# Patient Record
Sex: FEMALE | Race: WHITE | NOT HISPANIC OR LATINO | Employment: OTHER | URBAN - METROPOLITAN AREA
[De-identification: names, ages, dates, MRNs, and addresses within clinical notes are randomized per-mention and may not be internally consistent; named-entity substitution may affect disease eponyms.]

---

## 2017-04-19 ENCOUNTER — HOSPITAL ENCOUNTER (OUTPATIENT)
Dept: LAB | Facility: HOSPITAL | Age: 67
Setting detail: SPECIMEN
Discharge: HOME | End: 2017-04-19
Attending: INTERNAL MEDICINE | Admitting: INTERNAL MEDICINE

## 2017-04-19 LAB
ALBUMIN SERPL-MCNC: 3.6 G/DL (ref 3.5–4.8)
ALBUMIN/GLOB SERPL: 1.6 {RATIO} (ref 1–1.7)
ALP SERPL-CCNC: 87 IU/L (ref 32–91)
ALT SERPL-CCNC: 18 IU/L (ref 14–54)
ANION GAP SERPL CALC-SCNC: 14.2 MMOL/L (ref 10–20)
AST SERPL-CCNC: 19 IU/L (ref 15–41)
BILIRUB SERPL-MCNC: 0.6 MG/DL (ref 0.3–1.2)
BUN SERPL-MCNC: 8 MG/DL (ref 8–20)
BUN/CREAT SERPL: 16 (ref 5.4–26.2)
CALCIUM SERPL-MCNC: 9.5 MG/DL (ref 8.9–10.3)
CHLORIDE SERPL-SCNC: 104 MMOL/L (ref 101–111)
CHOLEST SERPL-MCNC: 179 MG/DL
CHOLEST/HDLC SERPL: 4.2 {RATIO}
CONV CO2: 26 MMOL/L (ref 22–32)
CONV LDL CHOLESTEROL DIRECT: 120 MG/DL (ref 0–100)
CONV TOTAL PROTEIN: 5.8 G/DL (ref 6.1–7.9)
CREAT UR-MCNC: 0.5 MG/DL (ref 0.4–1)
GLOBULIN UR ELPH-MCNC: 2.2 G/DL (ref 2.5–3.8)
GLUCOSE SERPL-MCNC: 109 MG/DL (ref 65–99)
HDLC SERPL-MCNC: 42 MG/DL
LDLC/HDLC SERPL: 2.8 {RATIO}
LIPID INTERPRETATION: ABNORMAL
POTASSIUM SERPL-SCNC: 4.2 MMOL/L (ref 3.6–5.1)
SODIUM SERPL-SCNC: 140 MMOL/L (ref 136–144)
T4 FREE SERPL-MCNC: 1.01 NG/DL (ref 0.58–1.64)
TRIGL SERPL-MCNC: 93 MG/DL
TSH SERPL-ACNC: 8.72 UIU/ML (ref 0.34–5.6)
VLDLC SERPL CALC-MCNC: 16.2 MG/DL

## 2017-04-26 ENCOUNTER — CONVERSION ENCOUNTER (OUTPATIENT)
Dept: ENDOCRINOLOGY | Facility: CLINIC | Age: 67
End: 2017-04-26

## 2017-07-21 ENCOUNTER — HOSPITAL ENCOUNTER (OUTPATIENT)
Dept: LAB | Facility: HOSPITAL | Age: 67
Setting detail: SPECIMEN
Discharge: HOME | End: 2017-07-21
Attending: INTERNAL MEDICINE | Admitting: INTERNAL MEDICINE

## 2017-07-21 LAB
T4 FREE SERPL-MCNC: 1.07 NG/DL (ref 0.58–1.64)
TSH SERPL-ACNC: 2.8 UIU/ML (ref 0.34–5.6)

## 2018-04-18 ENCOUNTER — HOSPITAL ENCOUNTER (OUTPATIENT)
Dept: LAB | Facility: HOSPITAL | Age: 68
Setting detail: SPECIMEN
Discharge: HOME | End: 2018-04-18
Attending: INTERNAL MEDICINE | Admitting: INTERNAL MEDICINE

## 2018-04-18 LAB
ALBUMIN SERPL-MCNC: 3.7 G/DL (ref 3.5–4.8)
ALBUMIN/GLOB SERPL: 1.8 {RATIO} (ref 1–1.7)
ALP SERPL-CCNC: 87 IU/L (ref 32–91)
ALT SERPL-CCNC: 18 IU/L (ref 14–54)
ANION GAP SERPL CALC-SCNC: 10.2 MMOL/L (ref 10–20)
AST SERPL-CCNC: 18 IU/L (ref 15–41)
BILIRUB SERPL-MCNC: 0.4 MG/DL (ref 0.3–1.2)
BUN SERPL-MCNC: 13 MG/DL (ref 8–20)
BUN/CREAT SERPL: 14.4 (ref 5.4–26.2)
CALCIUM SERPL-MCNC: 9.1 MG/DL (ref 8.9–10.3)
CHLORIDE SERPL-SCNC: 104 MMOL/L (ref 101–111)
CHOLEST SERPL-MCNC: 150 MG/DL
CHOLEST/HDLC SERPL: 4 {RATIO}
CONV CO2: 26 MMOL/L (ref 22–32)
CONV LDL CHOLESTEROL DIRECT: 95 MG/DL (ref 0–100)
CONV TOTAL PROTEIN: 5.8 G/DL (ref 6.1–7.9)
CREAT UR-MCNC: 0.9 MG/DL (ref 0.4–1)
GLOBULIN UR ELPH-MCNC: 2.1 G/DL (ref 2.5–3.8)
GLUCOSE SERPL-MCNC: 118 MG/DL (ref 65–99)
HDLC SERPL-MCNC: 37 MG/DL
LDLC/HDLC SERPL: 2.5 {RATIO}
LIPID INTERPRETATION: ABNORMAL
POTASSIUM SERPL-SCNC: 4.2 MMOL/L (ref 3.6–5.1)
SODIUM SERPL-SCNC: 136 MMOL/L (ref 136–144)
TRIGL SERPL-MCNC: 76 MG/DL
VLDLC SERPL CALC-MCNC: 17.8 MG/DL

## 2018-04-25 ENCOUNTER — CONVERSION ENCOUNTER (OUTPATIENT)
Dept: ENDOCRINOLOGY | Facility: CLINIC | Age: 68
End: 2018-04-25

## 2019-04-17 ENCOUNTER — HOSPITAL ENCOUNTER (OUTPATIENT)
Dept: LAB | Facility: HOSPITAL | Age: 69
Setting detail: SPECIMEN
Discharge: HOME | End: 2019-04-17
Attending: INTERNAL MEDICINE | Admitting: INTERNAL MEDICINE

## 2019-04-17 LAB
ALBUMIN SERPL-MCNC: 3.7 G/DL (ref 3.5–4.8)
ALBUMIN/GLOB SERPL: 1.5 {RATIO} (ref 1–1.7)
ALP SERPL-CCNC: 93 IU/L (ref 32–91)
ALT SERPL-CCNC: 16 IU/L (ref 14–54)
ANION GAP SERPL CALC-SCNC: 14.9 MMOL/L (ref 10–20)
AST SERPL-CCNC: 18 IU/L (ref 15–41)
BILIRUB SERPL-MCNC: 0.4 MG/DL (ref 0.3–1.2)
BUN SERPL-MCNC: 11 MG/DL (ref 8–20)
BUN/CREAT SERPL: 13.8 (ref 5.4–26.2)
CALCIUM SERPL-MCNC: 9.3 MG/DL (ref 8.9–10.3)
CHLORIDE SERPL-SCNC: 102 MMOL/L (ref 101–111)
CHOLEST SERPL-MCNC: 178 MG/DL
CHOLEST/HDLC SERPL: 4.7 {RATIO}
CONV CO2: 23 MMOL/L (ref 22–32)
CONV LDL CHOLESTEROL DIRECT: 134 MG/DL (ref 0–100)
CONV TOTAL PROTEIN: 6.1 G/DL (ref 6.1–7.9)
CREAT UR-MCNC: 0.8 MG/DL (ref 0.4–1)
GLOBULIN UR ELPH-MCNC: 2.4 G/DL (ref 2.5–3.8)
GLUCOSE SERPL-MCNC: 103 MG/DL (ref 65–99)
HDLC SERPL-MCNC: 38 MG/DL
LDLC/HDLC SERPL: 3.6 {RATIO}
LIPID INTERPRETATION: ABNORMAL
POTASSIUM SERPL-SCNC: 3.9 MMOL/L (ref 3.6–5.1)
SODIUM SERPL-SCNC: 136 MMOL/L (ref 136–144)
TRIGL SERPL-MCNC: 98 MG/DL
VLDLC SERPL CALC-MCNC: 5.9 MG/DL

## 2019-04-24 ENCOUNTER — CONVERSION ENCOUNTER (OUTPATIENT)
Dept: ENDOCRINOLOGY | Facility: CLINIC | Age: 69
End: 2019-04-24

## 2019-06-04 VITALS
SYSTOLIC BLOOD PRESSURE: 145 MMHG | DIASTOLIC BLOOD PRESSURE: 97 MMHG | WEIGHT: 197 LBS | BODY MASS INDEX: 31.66 KG/M2 | HEIGHT: 66 IN | DIASTOLIC BLOOD PRESSURE: 88 MMHG | HEART RATE: 95 BPM | SYSTOLIC BLOOD PRESSURE: 132 MMHG | WEIGHT: 192 LBS | SYSTOLIC BLOOD PRESSURE: 145 MMHG | HEIGHT: 66 IN | OXYGEN SATURATION: 98 % | DIASTOLIC BLOOD PRESSURE: 100 MMHG | WEIGHT: 193 LBS | HEART RATE: 98 BPM | BODY MASS INDEX: 31.02 KG/M2 | HEART RATE: 80 BPM | OXYGEN SATURATION: 99 %

## 2019-07-22 RX ORDER — ATENOLOL 25 MG/1
TABLET ORAL
Qty: 60 TABLET | Refills: 3 | Status: SHIPPED | OUTPATIENT
Start: 2019-07-22 | End: 2019-11-16 | Stop reason: SDUPTHER

## 2019-11-18 RX ORDER — ATENOLOL 25 MG/1
TABLET ORAL
Qty: 60 TABLET | Refills: 2 | Status: SHIPPED | OUTPATIENT
Start: 2019-11-18 | End: 2020-02-17

## 2019-12-19 DIAGNOSIS — E04.9 GOITER: ICD-10-CM

## 2019-12-19 DIAGNOSIS — E03.9 HYPOTHYROIDISM, UNSPECIFIED TYPE: ICD-10-CM

## 2019-12-19 DIAGNOSIS — E89.0 HYPOTHYROIDISM FOLLOWING RADIOIODINE THERAPY: ICD-10-CM

## 2019-12-19 DIAGNOSIS — E05.00 GRAVES' DISEASE: ICD-10-CM

## 2019-12-19 DIAGNOSIS — I10 HYPERTENSION, UNSPECIFIED TYPE: Primary | ICD-10-CM

## 2020-01-24 RX ORDER — ASPIRIN 81 MG/1
TABLET ORAL EVERY 24 HOURS
COMMUNITY
Start: 2015-04-22 | End: 2022-04-04

## 2020-01-24 RX ORDER — LEVOTHYROXINE SODIUM 137 UG/1
TABLET ORAL EVERY 24 HOURS
COMMUNITY
Start: 2019-04-02 | End: 2020-01-24 | Stop reason: SDUPTHER

## 2020-01-24 RX ORDER — LEVOTHYROXINE SODIUM 137 UG/1
TABLET ORAL
Qty: 30 TABLET | Refills: 3 | Status: SHIPPED | OUTPATIENT
Start: 2020-01-24 | End: 2020-04-01

## 2020-02-17 ENCOUNTER — TELEPHONE (OUTPATIENT)
Dept: ENDOCRINOLOGY | Facility: CLINIC | Age: 70
End: 2020-02-17

## 2020-02-17 RX ORDER — ATENOLOL 25 MG/1
TABLET ORAL
Qty: 60 TABLET | Refills: 2 | Status: SHIPPED | OUTPATIENT
Start: 2020-02-17 | End: 2020-04-01 | Stop reason: SDUPTHER

## 2020-02-17 RX ORDER — SULFAMETHOXAZOLE AND TRIMETHOPRIM 800; 160 MG/1; MG/1
TABLET ORAL
Qty: 28 TABLET | Refills: 0 | Status: SHIPPED | OUTPATIENT
Start: 2020-02-17 | End: 2020-04-01

## 2020-02-17 NOTE — TELEPHONE ENCOUNTER
Patient called and states that she has had a Sinus infection/ cold/ cough and it just wont go away. She is asking If there is anything that you could send in for her? I told her to contact her PCP, she said that you had told her to call if she needed anything as long as it didn't happen to often. Please advise?

## 2020-02-18 NOTE — TELEPHONE ENCOUNTER
Notified patient, she said she has already picked it up and feels so much better already after last nights dose.

## 2020-03-30 ENCOUNTER — LAB (OUTPATIENT)
Dept: LAB | Facility: HOSPITAL | Age: 70
End: 2020-03-30

## 2020-03-30 ENCOUNTER — TELEPHONE (OUTPATIENT)
Dept: ENDOCRINOLOGY | Facility: CLINIC | Age: 70
End: 2020-03-30

## 2020-03-30 DIAGNOSIS — E04.9 GOITER: ICD-10-CM

## 2020-03-30 DIAGNOSIS — E03.9 HYPOTHYROIDISM, UNSPECIFIED TYPE: ICD-10-CM

## 2020-03-30 DIAGNOSIS — I10 HYPERTENSION, UNSPECIFIED TYPE: ICD-10-CM

## 2020-03-30 DIAGNOSIS — E05.00 GRAVES' DISEASE: ICD-10-CM

## 2020-03-30 DIAGNOSIS — E89.0 HYPOTHYROIDISM FOLLOWING RADIOIODINE THERAPY: ICD-10-CM

## 2020-03-30 LAB
ALBUMIN SERPL-MCNC: 4.4 G/DL (ref 3.5–5.2)
ALBUMIN/GLOB SERPL: 2.1 G/DL
ALP SERPL-CCNC: 103 U/L (ref 39–117)
ALT SERPL W P-5'-P-CCNC: 15 U/L (ref 1–33)
ANION GAP SERPL CALCULATED.3IONS-SCNC: 12.3 MMOL/L (ref 5–15)
AST SERPL-CCNC: 14 U/L (ref 1–32)
BILIRUB SERPL-MCNC: 0.3 MG/DL (ref 0.2–1.2)
BUN BLD-MCNC: 14 MG/DL (ref 8–23)
BUN/CREAT SERPL: 19.4 (ref 7–25)
CALCIUM SPEC-SCNC: 9.4 MG/DL (ref 8.6–10.5)
CHLORIDE SERPL-SCNC: 100 MMOL/L (ref 98–107)
CHOLEST SERPL-MCNC: 166 MG/DL (ref 0–200)
CO2 SERPL-SCNC: 23.7 MMOL/L (ref 22–29)
CREAT BLD-MCNC: 0.72 MG/DL (ref 0.57–1)
GFR SERPL CREATININE-BSD FRML MDRD: 81 ML/MIN/1.73
GLOBULIN UR ELPH-MCNC: 2.1 GM/DL
GLUCOSE BLD-MCNC: 108 MG/DL (ref 65–99)
HDLC SERPL-MCNC: 36 MG/DL (ref 40–60)
LDLC SERPL CALC-MCNC: 105 MG/DL (ref 0–100)
LDLC/HDLC SERPL: 2.92 {RATIO}
POTASSIUM BLD-SCNC: 4.3 MMOL/L (ref 3.5–5.2)
PROT SERPL-MCNC: 6.5 G/DL (ref 6–8.5)
SODIUM BLD-SCNC: 136 MMOL/L (ref 136–145)
T4 FREE SERPL-MCNC: 1.86 NG/DL (ref 0.93–1.7)
TRIGL SERPL-MCNC: 124 MG/DL (ref 0–150)
TSH SERPL DL<=0.05 MIU/L-ACNC: 1.38 UIU/ML (ref 0.27–4.2)
VLDLC SERPL-MCNC: 24.8 MG/DL (ref 5–40)

## 2020-03-30 PROCEDURE — 84443 ASSAY THYROID STIM HORMONE: CPT

## 2020-03-30 PROCEDURE — 80061 LIPID PANEL: CPT

## 2020-03-30 PROCEDURE — 80053 COMPREHEN METABOLIC PANEL: CPT

## 2020-03-30 PROCEDURE — 36415 COLL VENOUS BLD VENIPUNCTURE: CPT

## 2020-03-30 PROCEDURE — 84439 ASSAY OF FREE THYROXINE: CPT

## 2020-04-01 ENCOUNTER — TELEMEDICINE (OUTPATIENT)
Dept: ENDOCRINOLOGY | Facility: CLINIC | Age: 70
End: 2020-04-01

## 2020-04-01 VITALS — BODY MASS INDEX: 29.25 KG/M2 | TEMPERATURE: 98.1 F | WEIGHT: 182 LBS | HEART RATE: 80 BPM | HEIGHT: 66 IN

## 2020-04-01 DIAGNOSIS — E89.0 HYPOTHYROIDISM FOLLOWING RADIOIODINE THERAPY: Primary | ICD-10-CM

## 2020-04-01 DIAGNOSIS — R73.9 HYPERGLYCEMIA: ICD-10-CM

## 2020-04-01 DIAGNOSIS — I10 ESSENTIAL HYPERTENSION: ICD-10-CM

## 2020-04-01 PROCEDURE — 99212 OFFICE O/P EST SF 10 MIN: CPT | Performed by: INTERNAL MEDICINE

## 2020-04-01 RX ORDER — LEVOTHYROXINE SODIUM 0.12 MG/1
125 TABLET ORAL DAILY
Qty: 90 TABLET | Refills: 3 | Status: SHIPPED | OUTPATIENT
Start: 2020-04-01 | End: 2021-03-29

## 2020-04-01 RX ORDER — CHLORAL HYDRATE 500 MG
CAPSULE ORAL
COMMUNITY
End: 2020-04-01

## 2020-04-01 RX ORDER — AMOXICILLIN AND CLAVULANATE POTASSIUM 875; 125 MG/1; MG/1
TABLET, FILM COATED ORAL
COMMUNITY
Start: 2020-02-18 | End: 2020-04-01

## 2020-04-01 RX ORDER — HYDROCODONE BITARTRATE AND ACETAMINOPHEN 5; 325 MG/1; MG/1
TABLET ORAL
COMMUNITY
Start: 2020-02-18 | End: 2020-04-01

## 2020-04-01 RX ORDER — ATENOLOL 25 MG/1
TABLET ORAL
Qty: 180 TABLET | Refills: 3 | Status: SHIPPED | OUTPATIENT
Start: 2020-04-01 | End: 2021-04-05 | Stop reason: SDUPTHER

## 2020-04-01 NOTE — PROGRESS NOTES
[facility]        Patient Care Team:  Urbano Noel MD as PCP - General  Provider, No Known as PCP - Family Medicine  Devin Gary MD as PCP - Claims Attributed    Chief Complaint:    Chief Complaint   Patient presents with   • Hypothyroidism         Subjective     Here for thyroid f/u  This is a telemedicine visit in view of the covid 19 pandemic using phone only due to problems with connection.  Taking meds regularly  Exercise program: daily x 30 min    Interval History:     Patient Complaints: stressed  Patient Denies:  Palpitations, tremors or eye pain   History taken from: patient    Review of Systems:   Review of Systems   HENT: Negative for trouble swallowing.    Eyes: Negative for blurred vision.   Cardiovascular: Negative for palpitations.   Gastrointestinal: Negative for nausea.   Endocrine: Negative for polyuria.   Neurological: Negative for tremors and headache.     Lost  11 lb since last visit  Objective     Vital Signs  Temp:  [98.1 °F (36.7 °C)] 98.1 °F (36.7 °C)  Heart Rate:  [80] 80    Physical Exam:  Not done. eVisit                                                  Results Review:    I have reviewed the patient's new clinical results, labs & imaging.    Medication Review:   Prior to Admission medications    Medication Sig Start Date End Date Taking? Authorizing Provider   Ascorbic Acid (SM VITAMIN C) 500 MG chewable tablet Chew 1,000 mg Daily. 4/20/16  Yes ProviderTrudi MD   aspirin (ADULT ASPIRIN EC LOW STRENGTH) 81 MG EC tablet Daily. 4/22/15  Yes Trudi Whitlock MD   atenolol (TENORMIN) 25 MG tablet One tab in am & one @ supper 4/1/20  Yes Urbano Noel MD   CINNAMON PO CINNAMON CAPS 4/14/15  Yes ProviderTrudi MD   Magnesium Oxide -Mg Supplement 400 MG capsule MAGNESIUM 400 MG ORAL CAPS 4/22/15  Yes Trudi Whitlock MD   Misc Natural Products (LUTEIN 20 PO) Take 20 mg by mouth Daily.   Yes Trudi Whitlock MD   Selenium  (SELENIMIN PO) Take  by mouth.   Yes Trudi Whitlock MD   Zinc 50 MG capsule Take  by mouth.   Yes Trudi Whitlock MD   atenolol (TENORMIN) 25 MG tablet TAKE ONE TABLET BY MOUTH EVERY MORNING AND TAKE ONE TABLET BY MOUTH DAILY AT SUPPER 2/17/20 4/1/20 Yes Urbano Noel MD   levothyroxine (SYNTHROID) 137 MCG tablet Take one daily 1/24/20 4/1/20 Yes Urbano Noel MD   levothyroxine (SYNTHROID, LEVOTHROID) 125 MCG tablet Take 1 tablet by mouth Daily. 4/1/20   Urbano Noel MD   amoxicillin-clavulanate (AUGMENTIN) 875-125 MG per tablet  2/18/20 4/1/20  Trudi Whitlock MD   HYDROcodone-acetaminophen (NORCO) 5-325 MG per tablet  2/18/20 4/1/20  Trudi Whitlock MD   Omega-3 Fatty Acids (FISH OIL) 1000 MG capsule capsule Take  by mouth.  4/1/20  Trudi Whitlock MD   sulfamethoxazole-trimethoprim (BACTRIM DS,SEPTRA DS) 800-160 MG per tablet One po bid x 2 weeks 2/17/20 4/1/20  Urbano Noel MD       Lab Results (most recent)     None            No results found for: HGBA1C   Lab Results   Component Value Date    GLUCOSE 108 (H) 03/30/2020    BUN 14 03/30/2020    CREATININE 0.72 03/30/2020    EGFRIFNONA 81 03/30/2020    BCR 19.4 03/30/2020    K 4.3 03/30/2020    CO2 23.7 03/30/2020    CALCIUM 9.4 03/30/2020    ALBUMIN 4.40 03/30/2020    LABIL2 1.5 04/17/2019    AST 14 03/30/2020    ALT 15 03/30/2020    CHOL 166 03/30/2020     (H) 03/30/2020    HDL 36 (L) 03/30/2020    TRIG 124 03/30/2020     Lab Results   Component Value Date    TSH 1.380 03/30/2020    FREET4 1.86 (H) 03/30/2020       Assessment/Plan     Annamarie was seen today for hypothyroidism.    Diagnoses and all orders for this visit:    Hypothyroidism following radioiodine therapy  -     TSH; Future  -     T4, Free; Future    Essential hypertension    Hyperglycemia    Other orders  -     atenolol (TENORMIN) 25 MG tablet; One tab in am & one @ supper  -     levothyroxine (SYNTHROID, LEVOTHROID) 125 MCG tablet; Take 1  tablet by mouth Daily.        Continue exercise.  Continue diet. Decrease sugar intake.  Decrease levothyroxine to one 6 d / week until finished.  Then stop & start 125 mcg one daily.  Continue other meds.  Repeat labs in 3 months.  Will call you with the lab results.          Urbano Noel MD  04/01/20  11:09

## 2020-04-01 NOTE — PATIENT INSTRUCTIONS
Continue exercise.  Decrease levothyroxine to one 6 d / week until finished.  Then stop & start 125 mcg one daily.  Continue other meds.  Repeat labs in 3 months.  Will call you with the lab results.  F/u in 1 y, with fasting labs prior.

## 2020-06-29 ENCOUNTER — LAB (OUTPATIENT)
Dept: LAB | Facility: HOSPITAL | Age: 70
End: 2020-06-29

## 2020-06-29 DIAGNOSIS — E89.0 HYPOTHYROIDISM FOLLOWING RADIOIODINE THERAPY: ICD-10-CM

## 2020-06-29 LAB
T4 FREE SERPL-MCNC: 1.62 NG/DL (ref 0.93–1.7)
TSH SERPL DL<=0.05 MIU/L-ACNC: 2.9 UIU/ML (ref 0.27–4.2)

## 2020-06-29 PROCEDURE — 36415 COLL VENOUS BLD VENIPUNCTURE: CPT

## 2020-06-29 PROCEDURE — 84439 ASSAY OF FREE THYROXINE: CPT

## 2020-06-29 PROCEDURE — 84443 ASSAY THYROID STIM HORMONE: CPT

## 2021-03-26 DIAGNOSIS — E89.0 HYPOTHYROIDISM FOLLOWING RADIOIODINE THERAPY: Primary | ICD-10-CM

## 2021-03-29 ENCOUNTER — LAB (OUTPATIENT)
Dept: LAB | Facility: HOSPITAL | Age: 71
End: 2021-03-29

## 2021-03-29 DIAGNOSIS — E89.0 HYPOTHYROIDISM FOLLOWING RADIOIODINE THERAPY: ICD-10-CM

## 2021-03-29 LAB
ALBUMIN SERPL-MCNC: 3.9 G/DL (ref 3.5–5.2)
ALBUMIN/GLOB SERPL: 2 G/DL
ALP SERPL-CCNC: 96 U/L (ref 39–117)
ALT SERPL W P-5'-P-CCNC: 12 U/L (ref 1–33)
ANION GAP SERPL CALCULATED.3IONS-SCNC: 10.4 MMOL/L (ref 5–15)
AST SERPL-CCNC: 14 U/L (ref 1–32)
BILIRUB SERPL-MCNC: 0.3 MG/DL (ref 0–1.2)
BUN SERPL-MCNC: 16 MG/DL (ref 8–23)
BUN/CREAT SERPL: 21.1 (ref 7–25)
CALCIUM SPEC-SCNC: 9.1 MG/DL (ref 8.6–10.5)
CHLORIDE SERPL-SCNC: 104 MMOL/L (ref 98–107)
CHOLEST SERPL-MCNC: 139 MG/DL (ref 0–200)
CO2 SERPL-SCNC: 23.6 MMOL/L (ref 22–29)
CREAT SERPL-MCNC: 0.76 MG/DL (ref 0.57–1)
GFR SERPL CREATININE-BSD FRML MDRD: 76 ML/MIN/1.73
GLOBULIN UR ELPH-MCNC: 2 GM/DL
GLUCOSE SERPL-MCNC: 101 MG/DL (ref 65–99)
HDLC SERPL-MCNC: 37 MG/DL (ref 40–60)
LDLC SERPL CALC-MCNC: 83 MG/DL (ref 0–100)
LDLC/HDLC SERPL: 2.2 {RATIO}
POTASSIUM SERPL-SCNC: 4.3 MMOL/L (ref 3.5–5.2)
PROT SERPL-MCNC: 5.9 G/DL (ref 6–8.5)
SODIUM SERPL-SCNC: 138 MMOL/L (ref 136–145)
T4 FREE SERPL-MCNC: 1.69 NG/DL (ref 0.93–1.7)
TRIGL SERPL-MCNC: 103 MG/DL (ref 0–150)
TSH SERPL DL<=0.05 MIU/L-ACNC: 2.29 UIU/ML (ref 0.27–4.2)
VLDLC SERPL-MCNC: 19 MG/DL (ref 5–40)

## 2021-03-29 PROCEDURE — 36415 COLL VENOUS BLD VENIPUNCTURE: CPT

## 2021-03-29 PROCEDURE — 80053 COMPREHEN METABOLIC PANEL: CPT

## 2021-03-29 PROCEDURE — 80061 LIPID PANEL: CPT

## 2021-03-29 PROCEDURE — 84439 ASSAY OF FREE THYROXINE: CPT

## 2021-03-29 PROCEDURE — 84443 ASSAY THYROID STIM HORMONE: CPT

## 2021-03-29 RX ORDER — LEVOTHYROXINE SODIUM 0.12 MG/1
TABLET ORAL
Qty: 30 TABLET | Refills: 11 | Status: SHIPPED | OUTPATIENT
Start: 2021-03-29 | End: 2021-04-05

## 2021-04-05 ENCOUNTER — OFFICE VISIT (OUTPATIENT)
Dept: ENDOCRINOLOGY | Facility: CLINIC | Age: 71
End: 2021-04-05

## 2021-04-05 VITALS
WEIGHT: 183 LBS | OXYGEN SATURATION: 97 % | SYSTOLIC BLOOD PRESSURE: 140 MMHG | TEMPERATURE: 98 F | BODY MASS INDEX: 29.41 KG/M2 | HEIGHT: 66 IN | HEART RATE: 102 BPM | DIASTOLIC BLOOD PRESSURE: 85 MMHG

## 2021-04-05 DIAGNOSIS — I10 ESSENTIAL HYPERTENSION: ICD-10-CM

## 2021-04-05 DIAGNOSIS — E89.0 HYPOTHYROIDISM FOLLOWING RADIOIODINE THERAPY: Primary | ICD-10-CM

## 2021-04-05 PROCEDURE — 99213 OFFICE O/P EST LOW 20 MIN: CPT | Performed by: INTERNAL MEDICINE

## 2021-04-05 RX ORDER — LEVOTHYROXINE SODIUM 0.12 MG/1
TABLET ORAL
Start: 2021-04-05 | End: 2022-04-04

## 2021-04-05 RX ORDER — ATENOLOL 25 MG/1
TABLET ORAL
Qty: 180 TABLET | Refills: 3 | Status: SHIPPED | OUTPATIENT
Start: 2021-04-05 | End: 2022-04-04 | Stop reason: SDUPTHER

## 2021-04-05 RX ORDER — MAGNESIUM OXIDE 400 MG/1
TABLET ORAL
COMMUNITY
End: 2021-04-05

## 2021-04-05 NOTE — PATIENT INSTRUCTIONS
Continue exercise.  Decrease levothyroxine to one 6 d / week.  Continue vit D supplements.  F/u in 1 , with fasting labs prior.

## 2021-04-05 NOTE — PROGRESS NOTES
[facility]        Patient Care Team:  Provider, Angelita Known as PCP - General    Chief Complaint:    Chief Complaint   Patient presents with   • Hypothyroidism         Subjective     Here for thyroid f/u  Working on diet  Exercise program: walking & horse back riding  Taking calcium +D    Interval History:     Patient Complaints: trouble sleeping  Patient Denies:  Palpitations   History taken from: patient    Review of Systems:   Review of Systems   HENT: Negative for trouble swallowing.    Eyes: Negative for blurred vision.   Gastrointestinal: Negative for nausea.   Endocrine: Negative for polyuria.   Neurological: Negative for tremors and headache.   Psychiatric/Behavioral: Positive for sleep disturbance.     Lost  10 lb since last visit  Objective     Vital Signs  Temp:  [98 °F (36.7 °C)] 98 °F (36.7 °C)  Heart Rate:  [102] 102  BP: (140)/(85) 140/85    Physical Exam:     General Appearance:    Alert, cooperative, in no acute distress   Head:    Normocephalic, without obvious abnormality, atraumatic   Eyes:       Mouth:  Neck:       No periorbital edema. No lid lag    No lesions    No goiter   Lungs:     Clear     Heart:    Regular rhythm and normal rate   Abdomen:     Normal bowel sounds, soft                 Extremities:   Moves all extremities well, no edema or tremors               Pulses:   Pulses palpable and equal bilaterally   Skin:   Dry   Neurologic:  DTR 1+          Results Review:    I have reviewed the patient's new clinical results, labs & imaging.    Medication Review:   Prior to Admission medications    Medication Sig Start Date End Date Taking? Authorizing Provider   Ascorbic Acid (SM VITAMIN C) 500 MG chewable tablet Chew 1,000 mg Daily. 4/20/16  Yes Trudi Whitlock MD   aspirin (ADULT ASPIRIN EC LOW STRENGTH) 81 MG EC tablet Daily. 4/22/15  Yes Trudi Whitlock MD   atenolol (TENORMIN) 25 MG tablet One tab in am & one @ supper 4/5/21  Yes Urbano Noel MD    Calcium Carbonate (CALCIUM 500 PO) Take  by mouth.   Yes Trudi Whitlock MD   CINNAMON PO CINNAMON CAPS 4/14/15  Yes Trudi Whitlock MD   levothyroxine (SYNTHROID, LEVOTHROID) 125 MCG tablet Take one tab 6 d / week. 4/5/21  Yes Urbano Noel MD   MAGNESIUM LACTATE PO Take  by mouth.   Yes Trudi Whitlock MD   Misc Natural Products (LUTEIN 20 PO) Take 20 mg by mouth Daily.   Yes Trudi Whitlock MD   Zinc 50 MG capsule Take  by mouth.   Yes Trudi Whitlock MD   atenolol (TENORMIN) 25 MG tablet One tab in am & one @ supper 4/1/20 4/5/21 Yes Urbano Noel MD   levothyroxine (SYNTHROID, LEVOTHROID) 125 MCG tablet TAKE ONE TABLET BY MOUTH DAILY 3/29/21 4/5/21 Yes Urbano Noel MD   magnesium oxide (MAG-OX) 400 MG tablet Take  by mouth.  4/5/21  Trudi Whitlock MD   Magnesium Oxide -Mg Supplement 400 MG capsule MAGNESIUM 400 MG ORAL CAPS 4/22/15 4/5/21  Trudi Whitlock MD   Selenium (SELENIMIN PO) Take  by mouth.  4/5/21  Trudi Whitlock MD       Lab Results (most recent)     None        Lab Results   Component Value Date    GLUCOSE 101 (H) 03/29/2021    BUN 16 03/29/2021    CREATININE 0.76 03/29/2021    EGFRIFNONA 76 03/29/2021    BCR 21.1 03/29/2021    K 4.3 03/29/2021    CO2 23.6 03/29/2021    CALCIUM 9.1 03/29/2021    ALBUMIN 3.90 03/29/2021    LABIL2 1.5 04/17/2019    AST 14 03/29/2021    ALT 12 03/29/2021    CHOL 139 03/29/2021    LDL 83 03/29/2021    HDL 37 (L) 03/29/2021    TRIG 103 03/29/2021     Lab Results   Component Value Date    TSH 2.290 03/29/2021    FREET4 1.69 03/29/2021       Assessment/Plan     Diagnoses and all orders for this visit:    1. Hypothyroidism following radioiodine therapy (Primary)  -     Lipid Panel; Future  -     T4, Free; Future  -     TSH; Future  -     levothyroxine (SYNTHROID, LEVOTHROID) 125 MCG tablet; Take one tab 6 d / week.    2. Essential hypertension  -     Comprehensive Metabolic Panel; Future  -     atenolol  (TENORMIN) 25 MG tablet; One tab in am & one @ supper  Dispense: 180 tablet; Refill: 3    Thyroid over treated. Hypertension stable.    Continue exercise.  Decrease levothyroxine to one 6 d / week.  Continue vit D supplements.        Urbano Noel MD  04/05/21  12:14 EDT

## 2021-04-30 ENCOUNTER — TELEPHONE (OUTPATIENT)
Dept: ENDOCRINOLOGY | Facility: CLINIC | Age: 71
End: 2021-04-30

## 2021-04-30 DIAGNOSIS — E89.0 HYPOTHYROIDISM FOLLOWING RADIOIODINE THERAPY: Primary | ICD-10-CM

## 2021-05-04 NOTE — TELEPHONE ENCOUNTER
Pt scheduled for lab 5/10 at 10:10. Pt states that she is going off of caffiene and  taking 3 mg of Melatonin and that seems to be helping with her sleep issues.

## 2021-05-10 ENCOUNTER — LAB (OUTPATIENT)
Dept: LAB | Facility: HOSPITAL | Age: 71
End: 2021-05-10

## 2021-05-10 DIAGNOSIS — E89.0 HYPOTHYROIDISM FOLLOWING RADIOIODINE THERAPY: ICD-10-CM

## 2021-05-10 LAB
T4 FREE SERPL-MCNC: 1.43 NG/DL (ref 0.93–1.7)
TSH SERPL DL<=0.05 MIU/L-ACNC: 3.43 UIU/ML (ref 0.27–4.2)

## 2021-05-10 PROCEDURE — 84439 ASSAY OF FREE THYROXINE: CPT

## 2021-05-10 PROCEDURE — 84443 ASSAY THYROID STIM HORMONE: CPT

## 2021-05-10 PROCEDURE — 36415 COLL VENOUS BLD VENIPUNCTURE: CPT

## 2021-08-30 ENCOUNTER — TELEPHONE (OUTPATIENT)
Dept: ENDOCRINOLOGY | Facility: CLINIC | Age: 71
End: 2021-08-30

## 2021-08-30 DIAGNOSIS — R05.9 COUGH: Primary | ICD-10-CM

## 2021-08-30 NOTE — TELEPHONE ENCOUNTER
Patient states she is in [place] until Tuesday. She saw an allergist and was diagnosed with allergic asthma at the end of May. She states the medication they put her on is not working. She is having trouble getting a hold of that physician at the allergist's office. She states she is having some difficulties with her breathing and coughing quite a bit. She states her brother whom she is visiting in [place] is a gastroenterologist and they told her they think she needs a chest xray to rule out anything else that might be going on. She is asking if you can order that xray for her.

## 2021-08-31 NOTE — TELEPHONE ENCOUNTER
I spoke with patient and she states she will be back home tonight and will go to [facility] tomorrow to get it done.

## 2021-09-07 ENCOUNTER — TELEPHONE (OUTPATIENT)
Dept: ENDOCRINOLOGY | Facility: CLINIC | Age: 71
End: 2021-09-07

## 2021-09-07 NOTE — TELEPHONE ENCOUNTER
Pt wanted to let Dr. Noel know that she has been diagnosed with stage 4 lung cancer. They do not currently have a plan, they are waiting on cytology which is expected to be available 9/19/21. She asked that I tell Dr. Noel not to worry, she is doing everything she is supposed to. She also wanted to thank Dr. Noel for being so sweet and caring.

## 2021-10-28 ENCOUNTER — TELEPHONE (OUTPATIENT)
Dept: ENDOCRINOLOGY | Facility: CLINIC | Age: 71
End: 2021-10-28

## 2021-10-28 NOTE — TELEPHONE ENCOUNTER
Patient called and states her oncologist wants her to take potassium chloride ER 10meq capsules BID and furosemide 40mg once a day, but he does not want to manage those medications/do the refills. She does not have a PCP. She is asking if you would manage those medications for her.

## 2021-10-28 NOTE — TELEPHONE ENCOUNTER
I called & talked to the pt.  The pulmonologist is doing thoracentesis q 2wks. They started her on the furosemide & KCl.  She just got notification that the Rx is ready in the pharmacy & she has appt with the pulmonary NP on 11/8.  So, it is all taken care of.

## 2022-03-25 ENCOUNTER — TELEPHONE (OUTPATIENT)
Dept: ENDOCRINOLOGY | Facility: CLINIC | Age: 72
End: 2022-03-25

## 2022-03-25 NOTE — TELEPHONE ENCOUNTER
Patient is having a pleurex catheter put in on March 31st. She just wanted to keep you updated. She has appt on April 4th with you that she is going to do as video because she is not supposed to move around much for 2 weeks afterwards.

## 2022-03-28 ENCOUNTER — LAB (OUTPATIENT)
Dept: LAB | Facility: HOSPITAL | Age: 72
End: 2022-03-28

## 2022-03-28 DIAGNOSIS — E89.0 HYPOTHYROIDISM FOLLOWING RADIOIODINE THERAPY: ICD-10-CM

## 2022-03-28 LAB
ALBUMIN SERPL-MCNC: 4 G/DL (ref 3.5–5.2)
ALBUMIN/GLOB SERPL: 1.8 G/DL
ALP SERPL-CCNC: 54 U/L (ref 39–117)
ALT SERPL W P-5'-P-CCNC: 11 U/L (ref 1–33)
ANION GAP SERPL CALCULATED.3IONS-SCNC: 16.1 MMOL/L (ref 5–15)
AST SERPL-CCNC: 13 U/L (ref 1–32)
BILIRUB SERPL-MCNC: 0.4 MG/DL (ref 0–1.2)
BUN SERPL-MCNC: 13 MG/DL (ref 8–23)
BUN/CREAT SERPL: 15.9 (ref 7–25)
CALCIUM SPEC-SCNC: 9 MG/DL (ref 8.6–10.5)
CHLORIDE SERPL-SCNC: 99 MMOL/L (ref 98–107)
CHOLEST SERPL-MCNC: 208 MG/DL (ref 0–200)
CO2 SERPL-SCNC: 24.9 MMOL/L (ref 22–29)
CREAT SERPL-MCNC: 0.82 MG/DL (ref 0.57–1)
EGFRCR SERPLBLD CKD-EPI 2021: 77.5 ML/MIN/1.73
GLOBULIN UR ELPH-MCNC: 2.2 GM/DL
GLUCOSE SERPL-MCNC: 105 MG/DL (ref 65–99)
HDLC SERPL-MCNC: 53 MG/DL (ref 40–60)
LDLC SERPL CALC-MCNC: 134 MG/DL (ref 0–100)
LDLC/HDLC SERPL: 2.48 {RATIO}
POTASSIUM SERPL-SCNC: 4.3 MMOL/L (ref 3.5–5.2)
PROT SERPL-MCNC: 6.2 G/DL (ref 6–8.5)
SODIUM SERPL-SCNC: 140 MMOL/L (ref 136–145)
T4 FREE SERPL-MCNC: 1.47 NG/DL (ref 0.93–1.7)
TRIGL SERPL-MCNC: 118 MG/DL (ref 0–150)
TSH SERPL DL<=0.05 MIU/L-ACNC: 7.33 UIU/ML (ref 0.27–4.2)
VLDLC SERPL-MCNC: 21 MG/DL (ref 5–40)

## 2022-03-28 PROCEDURE — 36415 COLL VENOUS BLD VENIPUNCTURE: CPT

## 2022-03-28 PROCEDURE — 80061 LIPID PANEL: CPT

## 2022-03-28 PROCEDURE — 84439 ASSAY OF FREE THYROXINE: CPT

## 2022-03-28 PROCEDURE — 84443 ASSAY THYROID STIM HORMONE: CPT

## 2022-03-28 PROCEDURE — 80053 COMPREHEN METABOLIC PANEL: CPT

## 2022-03-31 RX ORDER — FUROSEMIDE 40 MG/1
TABLET ORAL
COMMUNITY
Start: 2022-03-21

## 2022-03-31 RX ORDER — POTASSIUM CHLORIDE 750 MG/1
40 TABLET, EXTENDED RELEASE ORAL
COMMUNITY
Start: 2022-03-06

## 2022-03-31 RX ORDER — LEVOTHYROXINE SODIUM 0.12 MG/1
125 TABLET ORAL DAILY
COMMUNITY
End: 2022-04-04 | Stop reason: SDUPTHER

## 2022-03-31 RX ORDER — PANTOPRAZOLE SODIUM 40 MG/1
TABLET, DELAYED RELEASE ORAL 2 TIMES DAILY
COMMUNITY
Start: 2022-03-11

## 2022-03-31 RX ORDER — TRAZODONE HYDROCHLORIDE 50 MG/1
TABLET ORAL
COMMUNITY
Start: 2022-03-15

## 2022-04-04 ENCOUNTER — TELEMEDICINE (OUTPATIENT)
Dept: ENDOCRINOLOGY | Facility: CLINIC | Age: 72
End: 2022-04-04

## 2022-04-04 VITALS
SYSTOLIC BLOOD PRESSURE: 135 MMHG | DIASTOLIC BLOOD PRESSURE: 75 MMHG | HEIGHT: 66 IN | BODY MASS INDEX: 26.36 KG/M2 | WEIGHT: 164 LBS

## 2022-04-04 DIAGNOSIS — E89.0 HYPOTHYROIDISM FOLLOWING RADIOIODINE THERAPY: Primary | ICD-10-CM

## 2022-04-04 PROCEDURE — 99213 OFFICE O/P EST LOW 20 MIN: CPT | Performed by: INTERNAL MEDICINE

## 2022-04-04 RX ORDER — OXYCODONE HYDROCHLORIDE 5 MG/1
5 TABLET ORAL
COMMUNITY
Start: 2022-04-01 | End: 2022-04-04

## 2022-04-04 RX ORDER — ACETAMINOPHEN 500 MG
1000 TABLET ORAL EVERY 6 HOURS
COMMUNITY
Start: 2022-04-01 | End: 2022-04-04

## 2022-04-04 RX ORDER — LEVOTHYROXINE SODIUM 0.12 MG/1
125 TABLET ORAL DAILY
Qty: 90 TABLET | Refills: 3 | Status: SHIPPED | OUTPATIENT
Start: 2022-04-04 | End: 2022-07-05 | Stop reason: SDUPTHER

## 2022-04-04 RX ORDER — ATENOLOL 25 MG/1
TABLET ORAL
Qty: 180 TABLET | Refills: 3 | Status: SHIPPED | OUTPATIENT
Start: 2022-04-04

## 2022-04-04 NOTE — PROGRESS NOTES
[facility]        Patient Care Team:  Eric Matthew MD as PCP - General (Internal Medicine)    Chief Complaint:    Chief Complaint   Patient presents with   • Hypothyroidism         Subjective     Here for thyroid f/u  This pt consented to this telemedicine visit in view of post op, using phone only due to problems with connection.  Taking levothyroxine regularly  Exercise program: not released yet  Taking calcium +D    Interval History:     Patient Complaints: had esophageal ulcer recently & pleur-X catheter placed  Patient Denies:  Palpitations   History taken from: patient    Review of Systems:   Review of Systems   Constitutional: Positive for unexpected weight loss.   HENT: Negative for trouble swallowing.    Eyes: Negative for blurred vision.   Cardiovascular: Negative for palpitations.   Gastrointestinal: Negative for nausea.   Endocrine: Negative for polyuria.   Neurological: Negative for tremors and headache.     Lost  19 lb since last visit  Objective     Vital Signs  BP: (135)/(75) 135/75    Physical Exam: not done. eVisit          Results Review:    I have reviewed the patient's new clinical results, labs & imaging.    Medication Review:   Prior to Admission medications    Medication Sig Start Date End Date Taking? Authorizing Provider   Ascorbic Acid 500 MG chewable tablet Chew 750 mg Daily.   Yes Trudi Whitlock MD   atenolol (TENORMIN) 25 MG tablet One tab in am & one @ supper 4/4/22  Yes Urbano Noel MD   Calcium Carbonate (CALCIUM 500 PO) Take  by mouth.   Yes Trudi Whitlock MD   calcium carbonate-vitamin d 600-400 MG-UNIT per tablet Take 1 tablet by mouth. 12/22/21  Yes Trudi Whitlock MD   furosemide (LASIX) 40 MG tablet  3/21/22  Yes Trudi Whitlock MD   levothyroxine (SYNTHROID, LEVOTHROID) 125 MCG tablet Take 1 tablet by mouth Daily. 4/4/22  Yes Urbano Noel MD   Loperamide HCl (IMODIUM A-D PO) Take  by mouth As Needed.   Yes  Trudi Whitlock MD   Osimertinib Mesylate 80 MG tablet Take 80 mg by mouth Daily.   Yes Trudi Whitlock MD   pantoprazole (PROTONIX) 40 MG EC tablet 2 (Two) Times a Day. 3/11/22  Yes Trudi Whitlock MD   potassium chloride (K-DUR,KLOR-CON) 10 MEQ CR tablet 40 mEq. 3/6/22  Yes Trudi Whitlock MD   sertraline (ZOLOFT) 50 MG tablet  2/28/22  Yes Trudi Whitlock MD   traZODone (DESYREL) 50 MG tablet  3/15/22  Yes Trudi Whitlock MD   acetaminophen (TYLENOL) 500 MG tablet Take 1,000 mg by mouth Every 6 (Six) Hours. 4/1/22 4/4/22 Yes Trudi Whitlock MD   atenolol (TENORMIN) 25 MG tablet One tab in am & one @ supper 4/5/21 4/4/22 Yes Urbano Noel MD   levothyroxine (SYNTHROID, LEVOTHROID) 125 MCG tablet Take one tab 6 d / week. 4/5/21 4/4/22 Yes Urbano Noel MD   levothyroxine (SYNTHROID, LEVOTHROID) 125 MCG tablet Take 125 mcg by mouth Daily.  4/4/22 Yes Trudi Whitlock MD   oxyCODONE (ROXICODONE) 5 MG immediate release tablet Take 5 mg by mouth. 4/1/22 4/4/22 Yes Trudi Whitlock MD   Ascorbic Acid 500 MG chewable tablet Chew 1,000 mg Daily. 4/20/16 4/4/22  Trudi Whitlock MD   aspirin (aspirin) 81 MG EC tablet Daily. 4/22/15 4/4/22  Trudi Whitlock MD   CINNAMON PO CINNAMON CAPS 4/14/15 4/4/22  Trudi Whitlock MD   MAGNESIUM LACTATE PO Take  by mouth.  4/4/22  Trudi Whitlock MD   Misc Natural Products (LUTEIN 20 PO) Take 20 mg by mouth Daily.  4/4/22  Trudi Whitlock MD   Zinc 50 MG capsule Take  by mouth.  4/4/22  Trudi Whitlock MD       Lab Results (most recent)     None        Lab Results   Component Value Date    GLUCOSE 105 (H) 03/28/2022    BUN 13 03/28/2022    CREATININE 0.82 03/28/2022    EGFRIFNONA 76 03/29/2021    BCR 15.9 03/28/2022    K 3.9 03/31/2022    CO2 24.9 03/28/2022    CALCIUM 9.0 03/28/2022    ALBUMIN 4.00 03/28/2022    LABIL2 1.5 04/17/2019    AST 13 03/28/2022    ALT 11 03/28/2022    CHOL 208 (H)  03/28/2022     (H) 03/28/2022    HDL 53 03/28/2022    TRIG 118 03/28/2022     Lab Results   Component Value Date    TSH 7.330 (H) 03/28/2022    FREET4 1.47 03/28/2022       Assessment/Plan     Diagnoses and all orders for this visit:    1. Hypothyroidism following radioiodine therapy (Primary)  -     levothyroxine (SYNTHROID, LEVOTHROID) 125 MCG tablet; Take 1 tablet by mouth Daily.  Dispense: 90 tablet; Refill: 3  -     TSH; Future  -     T4, Free; Future    Other orders  -     atenolol (TENORMIN) 25 MG tablet; One tab in am & one @ supper  Dispense: 180 tablet; Refill: 3    Thyroid possibly over treated. Will recheck labs before making med changes.    Continue levothyroxine.  Have labs rechecked in 3 months.  Will call you with the lab results.    Spent 15 min talking to pt.    Urbano Noel MD  04/04/22  10:29 EDT

## 2022-04-04 NOTE — PATIENT INSTRUCTIONS
Continue levothyroxine.  Have labs rechecked in 3 months.  Will call you with the lab results.  F/u in 1y, with labs prior.

## 2022-07-05 DIAGNOSIS — E89.0 HYPOTHYROIDISM FOLLOWING RADIOIODINE THERAPY: ICD-10-CM

## 2022-07-05 RX ORDER — LEVOTHYROXINE SODIUM 0.12 MG/1
125 TABLET ORAL DAILY
Qty: 30 TABLET | Refills: 8 | Status: SHIPPED | OUTPATIENT
Start: 2022-07-05 | End: 2023-04-05 | Stop reason: SDUPTHER

## 2022-12-09 DIAGNOSIS — E05.00 GRAVES' DISEASE: Primary | ICD-10-CM

## 2023-04-05 DIAGNOSIS — E89.0 HYPOTHYROIDISM FOLLOWING RADIOIODINE THERAPY: ICD-10-CM

## 2023-04-05 RX ORDER — LEVOTHYROXINE SODIUM 0.12 MG/1
125 TABLET ORAL DAILY
Qty: 30 TABLET | Refills: 5 | Status: SHIPPED | OUTPATIENT
Start: 2023-04-05

## 2023-07-18 ENCOUNTER — OFFICE VISIT (OUTPATIENT)
Dept: ENDOCRINOLOGY | Facility: CLINIC | Age: 73
End: 2023-07-18
Payer: MEDICARE

## 2023-07-18 VITALS
DIASTOLIC BLOOD PRESSURE: 90 MMHG | SYSTOLIC BLOOD PRESSURE: 130 MMHG | WEIGHT: 192.6 LBS | HEART RATE: 79 BPM | BODY MASS INDEX: 30.95 KG/M2 | HEIGHT: 66 IN

## 2023-07-18 DIAGNOSIS — E89.0 HYPOTHYROIDISM FOLLOWING RADIOIODINE THERAPY: ICD-10-CM

## 2023-07-18 PROCEDURE — 3080F DIAST BP >= 90 MM HG: CPT | Performed by: INTERNAL MEDICINE

## 2023-07-18 PROCEDURE — 3075F SYST BP GE 130 - 139MM HG: CPT | Performed by: INTERNAL MEDICINE

## 2023-07-18 PROCEDURE — 99213 OFFICE O/P EST LOW 20 MIN: CPT | Performed by: INTERNAL MEDICINE

## 2023-07-18 PROCEDURE — 1159F MED LIST DOCD IN RCRD: CPT | Performed by: INTERNAL MEDICINE

## 2023-07-18 PROCEDURE — 1160F RVW MEDS BY RX/DR IN RCRD: CPT | Performed by: INTERNAL MEDICINE

## 2023-07-18 RX ORDER — LEVOTHYROXINE SODIUM 0.12 MG/1
TABLET ORAL
Qty: 100 TABLET | Refills: 3 | Status: SHIPPED | OUTPATIENT
Start: 2023-07-18

## 2023-07-18 NOTE — PATIENT INSTRUCTIONS
Continue exercise.  Continue multivitamins.  Increase levothyroxine to one tab 6 d / wk, 1 & 1/2 tab one day / wk.  F/u in 1y, with labs prior.

## 2023-07-30 NOTE — PROGRESS NOTES
[facility]        Patient Care Team:  Eric Matthew MD as PCP - General (Internal Medicine)    Chief Complaint:    Chief Complaint   Patient presents with   • Hypothyroidism         Subjective     Here for thyroid f/u  Taking levothyroxine one tab/d as directed  Exercise program: yard work  Taking multivitamins    Interval History:     Patient Complaints: tremors  Patient Denies:  Palpitations   History taken from: patient    Review of Systems:   Review of Systems   Constitutional:  Positive for unexpected weight gain.   HENT:  Negative for trouble swallowing.    Eyes:  Negative for blurred vision.   Cardiovascular:  Negative for palpitations.   Gastrointestinal:  Negative for nausea.   Endocrine: Negative for polyuria.   Neurological:  Positive for tremors. Negative for headache.   Psychiatric/Behavioral:  Positive for stress. The patient is nervous/anxious.    Gained 9 lb since last visit  Objective     Vital Signs     Vitals:    07/18/23 1320   BP: 130/90   Pulse: 79         Physical Exam:     General Appearance:    Alert, cooperative, in no acute distress, obese   Head:    Normocephalic, without obvious abnormality, atraumatic   Eyes:       Mouth:  Neck:       No periorbital edema. No lid lag    No lesions    No goiter   Lungs:     Clear    Heart:    Regular rhythm and normal rate   Abdomen:     Normal bowel sounds, soft                 Extremities:   Fine hand tremors, no edema               Pulses:   Pulses palpable and equal bilaterally   Skin:   Dry   Neurologic:  DTR 1+          Results Review:    I have reviewed the patient's new clinical results, labs & imaging.    Medication Review:   Prior to Admission medications    Medication Sig Start Date End Date Taking? Authorizing Provider   Ascorbic Acid 500 MG chewable tablet Chew 750 mg Daily.   Yes Provider, MD Trudi   atenolol (TENORMIN) 25 MG tablet One tab in am & one @ supper 4/4/22  Yes Urbano Noel MD   calcium  carbonate-vitamin d 600-400 MG-UNIT per tablet Take 1 tablet by mouth. 12/22/21  Yes Trudi Whitlock MD   levothyroxine (SYNTHROID, LEVOTHROID) 125 MCG tablet Take one tab daily 6 d /wk, 1 & 1/2 tab one day / week 7/18/23  Yes Urbano Noel MD   Loperamide HCl (IMODIUM A-D PO) Take  by mouth As Needed.   Yes ProviderTrudi MD   Osimertinib Mesylate 80 MG tablet Take 1 tablet by mouth Daily.   Yes ProviderTrudi MD   pantoprazole (PROTONIX) 40 MG EC tablet 2 (Two) Times a Day. 3/11/22  Yes Trudi Whitlock MD   sertraline (ZOLOFT) 100 MG tablet  3/16/23  Yes Trudi Whitlock MD   traZODone (DESYREL) 50 MG tablet  3/15/22  Yes Provider, MD Trudi       Lab Results (most recent)       None          Lab Results   Component Value Date    GLUCOSE 105 (H) 03/28/2022    BUN 13 03/28/2022    CREATININE 0.82 03/28/2022    EGFRIFNONA 76 03/29/2021    BCR 15.9 03/28/2022    K 3.9 03/31/2022    CO2 24.9 03/28/2022    CALCIUM 9.0 03/28/2022    ALBUMIN 4.00 03/28/2022    LABIL2 1.5 04/17/2019    AST 13 03/28/2022    ALT 11 03/28/2022    CHOL 208 (H) 03/28/2022     (H) 03/28/2022    HDL 53 03/28/2022    TRIG 118 03/28/2022     Lab Results   Component Value Date    TSH 7.330 (H) 03/28/2022    FREET4 0.99 07/11/2023     TSH 6.130 on 7/11/2023.    Assessment & Plan     Diagnoses and all orders for this visit:    1. Hypothyroidism following radioiodine therapy  -     levothyroxine (SYNTHROID, LEVOTHROID) 125 MCG tablet; Take one tab daily 6 d /wk, 1 & 1/2 tab one day / week  Dispense: 100 tablet; Refill: 3  -     TSH; Future  -     T4, Free; Future    Thyroid under treated.    Continue exercise.  Continue multivitamins.  Increase levothyroxine to one tab 6 d / wk, 1 & 1/2 tab one day / wk.        Urbano Nole MD  07/30/23  16:40 EDT        '

## 2023-09-11 RX ORDER — ATENOLOL 25 MG/1
TABLET ORAL
Qty: 180 TABLET | Refills: 3 | Status: SHIPPED | OUTPATIENT
Start: 2023-09-11

## 2024-08-13 DIAGNOSIS — E89.0 HYPOTHYROIDISM FOLLOWING RADIOIODINE THERAPY: Primary | ICD-10-CM

## 2024-08-13 DIAGNOSIS — E89.0 HYPOTHYROIDISM FOLLOWING RADIOIODINE THERAPY: ICD-10-CM

## 2024-08-13 RX ORDER — LEVOTHYROXINE SODIUM 0.12 MG/1
TABLET ORAL
Qty: 100 TABLET | Refills: 3 | Status: SHIPPED | OUTPATIENT
Start: 2024-08-13

## 2024-08-30 ENCOUNTER — TELEPHONE (OUTPATIENT)
Dept: ENDOCRINOLOGY | Facility: CLINIC | Age: 74
End: 2024-08-30
Payer: MEDICARE

## 2024-08-30 NOTE — TELEPHONE ENCOUNTER
Provider: LAUREN    Caller: DRU DAVILA    Relationship to Patient: SELF    Reason for Call: PATIENT NEEDS A NEW LAB ORDER PLACED. THE CURRENT ORDER SHE HAS FOR HER TSH HAS . PLEASE PLACE ORDER

## 2024-10-14 ENCOUNTER — OFFICE VISIT (OUTPATIENT)
Dept: ENDOCRINOLOGY | Facility: CLINIC | Age: 74
End: 2024-10-14
Payer: MEDICARE

## 2024-10-14 VITALS
HEART RATE: 87 BPM | HEIGHT: 66 IN | DIASTOLIC BLOOD PRESSURE: 78 MMHG | WEIGHT: 196 LBS | BODY MASS INDEX: 31.5 KG/M2 | SYSTOLIC BLOOD PRESSURE: 150 MMHG | OXYGEN SATURATION: 99 %

## 2024-10-14 DIAGNOSIS — E89.0 HYPOTHYROIDISM FOLLOWING RADIOIODINE THERAPY: Primary | ICD-10-CM

## 2024-10-14 PROCEDURE — 99213 OFFICE O/P EST LOW 20 MIN: CPT | Performed by: INTERNAL MEDICINE

## 2024-10-14 PROCEDURE — 3077F SYST BP >= 140 MM HG: CPT | Performed by: INTERNAL MEDICINE

## 2024-10-14 PROCEDURE — 3078F DIAST BP <80 MM HG: CPT | Performed by: INTERNAL MEDICINE

## 2024-10-14 RX ORDER — LEVOTHYROXINE SODIUM 125 UG/1
TABLET ORAL
Qty: 100 TABLET | Refills: 3 | Status: SHIPPED | OUTPATIENT
Start: 2024-10-14

## 2024-10-14 RX ORDER — ATENOLOL 25 MG/1
TABLET ORAL
Qty: 180 TABLET | Refills: 3 | Status: SHIPPED | OUTPATIENT
Start: 2024-10-14

## 2024-10-14 NOTE — PATIENT INSTRUCTIONS
Continue exercise.  Continue levothyroxine, atenolol & multivitamins.  F/u in 1y, with labs prior.

## 2024-10-26 NOTE — PROGRESS NOTES
[facility]        Patient Care Team:  Eric Matthew MD as PCP - General (Internal Medicine)    Chief Complaint:    Chief Complaint   Patient presents with   • Hypothyroidism     FU / Hypothyroidism          Subjective     Here for thyroid f/u  Taking levothyroxine one 6d/wk, 1&1/2 one d/wk as directed  Exercise program: yard work  Taking multivitamins    Interval History:     Patient Comments: cancer in remission  Patient Denies:  Palpitations   History taken from: patient    Review of Systems:   Review of Systems   HENT:  Negative for trouble swallowing.    Eyes:  Negative for blurred vision.   Cardiovascular:  Negative for palpitations.   Gastrointestinal:  Negative for nausea.   Endocrine: Negative for polyuria.   Neurological:  Negative for tremors and headache.   Gained 4 lb since last visit  Objective     Vital Signs     Vitals:    10/14/24 1231   BP: 150/78   Pulse: 87   SpO2: 99%         Physical Exam:     General Appearance:    Alert, cooperative, in no acute distress, obese   Head:    Normocephalic, without obvious abnormality, atraumatic   Eyes:       Mouth:  Neck:       No periorbital edema. No lid lag    No lesions    No goiter   Lungs:     Clear     Heart:    Regular rhythm and normal rate   Abdomen:     Normal bowel sounds, soft                 Extremities:   Moves all extremities well, no edema or tremors               Pulses:   Pulses palpable and equal bilaterally   Skin:   Dry   Neurologic:  DTR 1+          Results Review:    I have reviewed the patient's new clinical results, labs & imaging.    Medication Review:   Prior to Admission medications    Medication Sig Start Date End Date Taking? Authorizing Provider   Ascorbic Acid 500 MG chewable tablet Chew 750 mg Daily.   Yes Provider, MD Trudi   atenolol (TENORMIN) 25 MG tablet TAKE ONE TABLET BY MOUTH EVERY MORNING AND TAKE ONE TABLET BY MOUTH EVERY EVENING AT SUPPER 10/14/24  Yes Urbano Noel MD   calcium  carbonate-vitamin d 600-400 MG-UNIT per tablet Take 1 tablet by mouth. 12/22/21  Yes Trudi Whitlock MD   levothyroxine (SYNTHROID, LEVOTHROID) 125 MCG tablet Take one tab daily 6 d /wk, 1 & 1/2 tab one day / week 10/14/24  Yes Urbano Noel MD   Loperamide HCl (IMODIUM A-D PO) Take  by mouth As Needed.   Yes ProviderTrudi MD   Osimertinib Mesylate 80 MG tablet Take 1 tablet by mouth Daily.   Yes ProviderTrudi MD   pantoprazole (PROTONIX) 40 MG EC tablet 2 (Two) Times a Day. 3/11/22  Yes Trudi Whitlock MD   sertraline (ZOLOFT) 100 MG tablet  3/16/23  Yes Trudi Whitlock MD   traZODone (DESYREL) 50 MG tablet  3/15/22  Yes Provider, MD Trudi         Lab Results   Component Value Date    HGBA1C 4.7 06/12/2024      Lab Results   Component Value Date    GLUCOSE 105 (H) 03/28/2022    BUN 16 03/02/2023    CREATININE 0.79 03/02/2023    EGFRIFNONA 76 03/29/2021    EGFRIFAFRI >60 03/02/2023    BCR 20.3 03/02/2023    K 3.9 03/02/2023    CO2 22 03/02/2023    CALCIUM 9.7 03/02/2023    ALBUMIN 4.5 03/02/2023    LABIL2 1.7 03/02/2023    AST 14 03/02/2023    ALT 13 03/02/2023    CHOL 208 (H) 03/28/2022     (H) 03/28/2022    HDL 53 03/28/2022    TRIG 118 03/28/2022     Lab Results   Component Value Date    TSH 7.330 (H) 03/28/2022    FREET4 0.99 07/11/2023     TSH 3.52, FreeT4 1.3 on 10/7/2024    Assessment & Plan     Diagnoses and all orders for this visit:    1. Hypothyroidism following radioiodine therapy (Primary)  -     levothyroxine (SYNTHROID, LEVOTHROID) 125 MCG tablet; Take one tab daily 6 d /wk, 1 & 1/2 tab one day / week  Dispense: 100 tablet; Refill: 3  -     atenolol (TENORMIN) 25 MG tablet; TAKE ONE TABLET BY MOUTH EVERY MORNING AND TAKE ONE TABLET BY MOUTH EVERY EVENING AT SUPPER  Dispense: 180 tablet; Refill: 3  -     TSH; Future  -     T4, Free; Future    Thyroid stable.    Continue exercise.  Continue levothyroxine, atenolol & multivitamins.        Maryi  MD Sadie  10/26/24  15:12 EDT